# Patient Record
Sex: FEMALE | Race: ASIAN | NOT HISPANIC OR LATINO | URBAN - METROPOLITAN AREA
[De-identification: names, ages, dates, MRNs, and addresses within clinical notes are randomized per-mention and may not be internally consistent; named-entity substitution may affect disease eponyms.]

---

## 2022-10-19 ENCOUNTER — APPOINTMENT (RX ONLY)
Dept: URBAN - METROPOLITAN AREA CLINIC 139 | Facility: CLINIC | Age: 87
Setting detail: DERMATOLOGY
End: 2022-10-19

## 2022-10-19 PROBLEM — C44.1221 SQUAMOUS CELL CARCINOMA OF SKIN OF RIGHT UPPER EYELID, INCLUDING CANTHUS: Status: ACTIVE | Noted: 2022-10-19

## 2022-10-19 PROCEDURE — 99203 OFFICE O/P NEW LOW 30 MIN: CPT

## 2022-10-19 PROCEDURE — ? DEFER

## 2022-10-19 NOTE — PROCEDURE: DEFER
Detail Level: Detailed
X Size Of Lesion In Cm (Optional): 0
Introduction Text (Please End With A Colon): The following procedure was deferred: pt declines treatment today. She will reschedule Mohs.
Procedure To Be Performed At Next Visit: Mohs surgery

## 2022-10-19 NOTE — HPI: PROCEDURE (MOHS)
Has The Growth Been Previously Biopsied?: has not been previously biopsied
Body Location Override (Optional): Right upper eyelid

## 2022-11-07 ENCOUNTER — APPOINTMENT (RX ONLY)
Dept: URBAN - METROPOLITAN AREA CLINIC 139 | Facility: CLINIC | Age: 87
Setting detail: DERMATOLOGY
End: 2022-11-07

## 2022-11-07 PROBLEM — C44.329 SQUAMOUS CELL CARCINOMA OF SKIN OF OTHER PARTS OF FACE: Status: ACTIVE | Noted: 2022-11-07

## 2022-11-07 PROCEDURE — 99212 OFFICE O/P EST SF 10 MIN: CPT

## 2022-11-07 PROCEDURE — ? OBSERVATION

## 2022-11-07 NOTE — PROCEDURE: OBSERVATION
Body Location Override (Optional - Billing Will Still Be Based On Selected Body Map Location If Applicable): right upper eyelid
Detail Level: Detailed
Size Of Lesion In Cm (Optional): 0

## 2023-05-19 ENCOUNTER — HOME HEALTH ADMISSION (OUTPATIENT)
Dept: HOME HEALTH SERVICES | Facility: HOME HEALTH | Age: 88
End: 2023-05-19

## 2023-06-03 ENCOUNTER — HOME CARE VISIT (OUTPATIENT)
Dept: SCHEDULING | Facility: HOME HEALTH | Age: 88
End: 2023-06-03

## 2023-06-03 VITALS
OXYGEN SATURATION: 94 % | SYSTOLIC BLOOD PRESSURE: 128 MMHG | RESPIRATION RATE: 18 BRPM | DIASTOLIC BLOOD PRESSURE: 72 MMHG | HEART RATE: 63 BPM | TEMPERATURE: 97.5 F

## 2023-06-03 PROCEDURE — G0299 HHS/HOSPICE OF RN EA 15 MIN: HCPCS

## 2023-06-03 ASSESSMENT — ENCOUNTER SYMPTOMS
PAIN LOCATION - PAIN QUALITY: SHARP
STOOL DESCRIPTION: FORMED

## 2023-06-03 NOTE — HOME HEALTH
SN SOC vs today. 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 Santyl 3x a wk to left heel and SSD ointment to dorsal wound. Pt is DM and does not check bs. Minimal pain occasionally with ambulation. No other open areas noted at [facility]. Pt to see md at [facility] on thursdays and SN to change wound dressings on tues/sat. No other disciplines involved in case at this time. Pt uses a seated walker and has pd cg that comes in 2x a wk to assist with ADLS.

## 2023-06-06 ENCOUNTER — HOME CARE VISIT (OUTPATIENT)
Dept: HOME HEALTH SERVICES | Facility: HOME HEALTH | Age: 88
End: 2023-06-06
Payer: MEDICARE

## 2023-06-06 ENCOUNTER — HOME CARE VISIT (OUTPATIENT)
Dept: SCHEDULING | Facility: HOME HEALTH | Age: 88
End: 2023-06-06

## 2023-06-06 VITALS
OXYGEN SATURATION: 94 % | SYSTOLIC BLOOD PRESSURE: 140 MMHG | RESPIRATION RATE: 18 BRPM | HEART RATE: 68 BPM | TEMPERATURE: 98.2 F | DIASTOLIC BLOOD PRESSURE: 60 MMHG

## 2023-06-06 VITALS
TEMPERATURE: 98.2 F | HEART RATE: 68 BPM | RESPIRATION RATE: 18 BRPM | DIASTOLIC BLOOD PRESSURE: 60 MMHG | SYSTOLIC BLOOD PRESSURE: 140 MMHG | OXYGEN SATURATION: 94 %

## 2023-06-06 PROCEDURE — G0299 HHS/HOSPICE OF RN EA 15 MIN: HCPCS

## 2023-06-06 ASSESSMENT — ENCOUNTER SYMPTOMS
PAIN LOCATION - PAIN QUALITY: SHARP
STOOL DESCRIPTION: FORMED

## 2023-06-06 NOTE — HOME HEALTH
SN vs today for wound care to left heel and left dorsal foot. Wound is stable and not ss of infection noted. Pt is diabetic and does not check bs regularly. Discussed need to check bs and effects of dm and wound healing. Pt agreed to get a machine. Pain only when ambulating in left heel. VS WNL. Pt to go to [facility] on thursday. SN next vs saturday. No other open areas noted.

## 2023-06-10 ENCOUNTER — HOME CARE VISIT (OUTPATIENT)
Dept: HOME HEALTH SERVICES | Facility: HOME HEALTH | Age: 88
End: 2023-06-10
Payer: MEDICARE

## 2023-06-17 ENCOUNTER — HOME CARE VISIT (OUTPATIENT)
Dept: HOME HEALTH SERVICES | Facility: HOME HEALTH | Age: 88
End: 2023-06-17
Payer: MEDICARE

## 2023-06-19 ASSESSMENT — ENCOUNTER SYMPTOMS: DYSPNEA ACTIVITY LEVEL: AFTER AMBULATING MORE THAN 20 FT

## 2023-06-20 ENCOUNTER — HOME CARE VISIT (OUTPATIENT)
Dept: SCHEDULING | Facility: HOME HEALTH | Age: 88
End: 2023-06-20
Payer: MEDICARE

## 2023-06-20 PROCEDURE — G0300 HHS/HOSPICE OF LPN EA 15 MIN: HCPCS

## 2023-06-21 VITALS
RESPIRATION RATE: 18 BRPM | OXYGEN SATURATION: 96 % | SYSTOLIC BLOOD PRESSURE: 134 MMHG | HEART RATE: 72 BPM | TEMPERATURE: 97.4 F | DIASTOLIC BLOOD PRESSURE: 60 MMHG

## 2023-06-22 NOTE — HOME HEALTH
Patient in pleasant mood. Patient agreeable to SN visit. Wound care per physicians orders. Patient denied any pain/discomfort with procedure. Patient reports spouse is in hospital due to a fall. Family is staying with her at this time. Family member reports she has been doing wound care. Caregiver involvement: Patient lives with spouse  Medications reconciled and all medications are available in home  Home health supplies by type and quantity ordered/delivered this visit N/A  Patient education provided this visit: SN educated patient on signs of infection. Patient  verbalizes understanding via the teach back method.    Progress toward goals:Ongoing  Plan for next visit: wound care  The following discharge planning :DC when goals met and wound resolves/closes

## 2023-06-24 ENCOUNTER — HOME CARE VISIT (OUTPATIENT)
Dept: SCHEDULING | Facility: HOME HEALTH | Age: 88
End: 2023-06-24
Payer: MEDICARE

## 2023-06-24 VITALS
OXYGEN SATURATION: 95 % | SYSTOLIC BLOOD PRESSURE: 128 MMHG | RESPIRATION RATE: 18 BRPM | TEMPERATURE: 97.9 F | DIASTOLIC BLOOD PRESSURE: 60 MMHG | HEART RATE: 70 BPM

## 2023-06-24 PROCEDURE — G0299 HHS/HOSPICE OF RN EA 15 MIN: HCPCS

## 2023-06-24 ASSESSMENT — ENCOUNTER SYMPTOMS
TROUBLE SWALLOWING: 1
STOOL DESCRIPTION: FORMED

## 2023-06-24 NOTE — HOME HEALTH
SN discharge vs from [facility] services today. Patient with left heel wound still open. Dtr Zulema is indep with wound care using santyl. Instructed on ss of infection and medications. Pt follows up with [address] [place] [address],[address] weekly. Pain in left heel managed with medication and pt does not monitor glucose readings. Instructed to call md if any problem with wound or any other issues. Pt and daughter verbally agreed.

## 2023-08-05 NOTE — HOME HEALTH
Patients daughter called and stated she had already done the wound care this am.  No need for sn visit today

## 2023-08-05 NOTE — HOME HEALTH
SN called patient lastnight with visit time for today but phone was busy. Called this am and patient does not speak english well stated her  went to the hospital yesterday but did not understand SN was coming for vs today.  called daughter Etelvnia Henriquez and she stated she did the wound care this AM and patient did not need a visit today. She also stated she is able to do the wound care on weekends. Will notify MD that SN vs will be decreased to weekly.

## 2023-08-08 ENCOUNTER — HOME HEALTH ADMISSION (OUTPATIENT)
Dept: HOME HEALTH SERVICES | Facility: HOME HEALTH | Age: 88
End: 2023-08-08

## 2023-08-19 ENCOUNTER — HOME CARE VISIT (OUTPATIENT)
Dept: SCHEDULING | Facility: HOME HEALTH | Age: 88
End: 2023-08-19

## 2023-08-19 VITALS
TEMPERATURE: 98.2 F | OXYGEN SATURATION: 94 % | RESPIRATION RATE: 18 BRPM | DIASTOLIC BLOOD PRESSURE: 78 MMHG | HEART RATE: 75 BPM | SYSTOLIC BLOOD PRESSURE: 148 MMHG

## 2023-08-19 PROCEDURE — 0221000100 HH NO PAY CLAIM PROCEDURE

## 2023-08-19 PROCEDURE — G0299 HHS/HOSPICE OF RN EA 15 MIN: HCPCS

## 2023-08-19 ASSESSMENT — ENCOUNTER SYMPTOMS
STOOL DESCRIPTION: FORMED
DYSPNEA ACTIVITY LEVEL: AFTER AMBULATING MORE THAN 20 FT

## 2023-08-21 ENCOUNTER — HOME CARE VISIT (OUTPATIENT)
Dept: SCHEDULING | Facility: HOME HEALTH | Age: 88
End: 2023-08-21

## 2023-08-21 VITALS
RESPIRATION RATE: 18 BRPM | DIASTOLIC BLOOD PRESSURE: 80 MMHG | TEMPERATURE: 98 F | HEART RATE: 75 BPM | SYSTOLIC BLOOD PRESSURE: 164 MMHG

## 2023-08-21 PROCEDURE — G0151 HHCP-SERV OF PT,EA 15 MIN: HCPCS

## 2023-08-21 ASSESSMENT — ENCOUNTER SYMPTOMS: PAIN LOCATION - PAIN QUALITY: ACHY

## 2023-08-21 NOTE — HOME HEALTH
Pt is an 79 yo female s/p hospitalization due to SOB, UTI and acute renal failure. Skilled PT intervention orders received. PMH includes: OA, Bradycardia, CAD, HTN, CABG, CHF. Pt lived with her  in a single story home with 3 step to enter, pt's  passed away approximately one month ago. Pt's daughter and son are currently visiting to assist with all needs. Daughter is leaving soon but pt's son is staying for about 2 months. Family is trying to get their mother's care organized. Pt had a private caregiver 2 days a week to assist with showers prior to hospitalization but no longer has her. Pt used a 4ww for all mobility, slept in the living room on either a recliner or couch and used BCS in the living room. Pt does have a wc that she used for communty outings. Pt presents now showing impaired BLE strength scoring 3/5, impaired dynamic balance scoring 12/28 on the Tinetti test and decreased cv endurance scoring 5/10 on the modified Rylee scale. Pt demonstrates difficulty performing safe functional transfers especially sit-stand due to L knee pain, standing activities and household ambulation without assistance as able to do in PLOF. Per daughter they are waiting on the delivery of a hospital bed that will replace the couch in the living room. Daughter has requested psych nursing to assess her mother. PT also asked if she would like [facility] to assist with community resources but daughter stated they already have someone who is helping them. Daughter also reports that pt is currently angry at her family for admitting her to the hospital.    Pt will benefit from skilled PT intervention in order to improve functional deficits and establish a HEP to allow pt to perform daily functional tasks independently and safely using her 4ww for support.  PT reviewed POC, tx frequency, tx goals, safety precautions, fall prevention strategies, pain mangement, safe med management, importance of elevating BLE due to edema and

## 2023-08-22 ENCOUNTER — HOME CARE VISIT (OUTPATIENT)
Dept: SCHEDULING | Facility: HOME HEALTH | Age: 88
End: 2023-08-22

## 2023-08-22 VITALS
TEMPERATURE: 98.5 F | OXYGEN SATURATION: 93 % | SYSTOLIC BLOOD PRESSURE: 163 MMHG | DIASTOLIC BLOOD PRESSURE: 69 MMHG | HEART RATE: 68 BPM | RESPIRATION RATE: 18 BRPM

## 2023-08-22 VITALS
TEMPERATURE: 97.1 F | HEART RATE: 72 BPM | OXYGEN SATURATION: 98 % | RESPIRATION RATE: 18 BRPM | SYSTOLIC BLOOD PRESSURE: 160 MMHG | DIASTOLIC BLOOD PRESSURE: 80 MMHG

## 2023-08-22 PROCEDURE — G0299 HHS/HOSPICE OF RN EA 15 MIN: HCPCS

## 2023-08-22 PROCEDURE — G0152 HHCP-SERV OF OT,EA 15 MIN: HCPCS

## 2023-08-22 ASSESSMENT — ENCOUNTER SYMPTOMS
DYSPNEA ACTIVITY LEVEL: AFTER AMBULATING MORE THAN 20 FT
PAIN LOCATION - PAIN QUALITY: ACHE

## 2023-08-22 NOTE — HOME HEALTH
This patient has answered NO to the following questions:   1) have you traveled outside the country   2) have you been exposed to or been in contact with anyone whom traveled outside the country in the past two weeks   3) do you have a sore throat/fever/dry cough      4)The patient has been educated on and demonstrates good understanding via the teach-back method for the following: Signs and symptoms of COVID-19 yes    No reported changes or incidents since previous visit  Pt is an 79 yo female s/p hospitalization due to SOB, UTI and acute renal failure. . Pt returned home 8/17/23 . PMH includes: OA, Bradycardia, CAD, HTN, CABG, CHF. Pt lived with her  in a single story home with 3 step to enter, pt's  passed away approximately one month ago. Pt's daughter and son are currently visiting to assist with all needs. Daughter is leaving soon but pt's son is staying for about 2 months. Family is trying to get their mother's care organized. Pt had a private caregiver 2 days a week to assist with showers prior to hospitalization but no longer has her. Pt currently has son and nephew's friend living with pt for now. [facility]- hospital bed is being delivered in next few days, has walker, cane, shower chair, one grab bar  Pt has wound on left heel since march 2023 which nurse is addressing. Pt ambulates in home with walker with min assist for safety, min/mod assist for sit to stand from recliner, mod assist for ADL's including sponge bath only at this time due to weakness and wound on left heel. Pt uses bedside commode in living area beside recliner/couch where she is sleeping. Pt demo's decreased right UE ROM from old injury limiting weight bearing and reaching. Pt demo's decresaed standing tolerance/balance requiring frequent rest periods with ADL's and IADL's and UE support at all times.     Pt would benefit with OT to increase safety and indep with ADL's, IADL's, increase bilat UE strength for functional

## 2023-08-25 ENCOUNTER — HOME CARE VISIT (OUTPATIENT)
Dept: SCHEDULING | Facility: HOME HEALTH | Age: 88
End: 2023-08-25

## 2023-08-25 NOTE — HOME HEALTH
Patient DC from [address] [facility] services with all goals met. SN provided education on s/s of infection, anxiety, depression, post op complications, HTN, and medication management. Patient verbalized understanding of education provided and when to call MD. PT/OT provided therapy, patient tolerated well.  Patient continues with wound to left foot